# Patient Record
Sex: MALE | Race: WHITE
[De-identification: names, ages, dates, MRNs, and addresses within clinical notes are randomized per-mention and may not be internally consistent; named-entity substitution may affect disease eponyms.]

---

## 2018-04-29 ENCOUNTER — HOSPITAL ENCOUNTER (EMERGENCY)
Dept: HOSPITAL 95 - ER | Age: 48
Discharge: HOME | End: 2018-04-29
Payer: COMMERCIAL

## 2018-04-29 VITALS — BODY MASS INDEX: 27.09 KG/M2 | WEIGHT: 200 LBS | HEIGHT: 72 IN

## 2018-04-29 DIAGNOSIS — Z88.6: ICD-10-CM
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
## 2018-04-29 DIAGNOSIS — Z79.899: ICD-10-CM

## 2018-04-29 DIAGNOSIS — F17.200: ICD-10-CM

## 2018-04-29 DIAGNOSIS — S83.92XA: Primary | ICD-10-CM

## 2018-04-29 DIAGNOSIS — Z88.8: ICD-10-CM

## 2019-10-09 ENCOUNTER — HOSPITAL ENCOUNTER (OUTPATIENT)
Dept: HOSPITAL 95 - ORSCMMR | Age: 49
Discharge: HOME | End: 2019-10-09
Attending: SURGERY
Payer: COMMERCIAL

## 2019-10-09 VITALS — BODY MASS INDEX: 27.56 KG/M2 | HEIGHT: 70.98 IN | WEIGHT: 196.87 LBS

## 2019-10-09 DIAGNOSIS — F17.210: ICD-10-CM

## 2019-10-09 DIAGNOSIS — J45.909: ICD-10-CM

## 2019-10-09 DIAGNOSIS — E03.9: ICD-10-CM

## 2019-10-09 DIAGNOSIS — Z79.899: ICD-10-CM

## 2019-10-09 DIAGNOSIS — K40.90: Primary | ICD-10-CM

## 2019-10-09 DIAGNOSIS — D53.9: ICD-10-CM

## 2019-10-09 PROCEDURE — C1781 MESH (IMPLANTABLE): HCPCS

## 2019-10-09 PROCEDURE — 0YU60JZ SUPPLEMENT LEFT INGUINAL REGION WITH SYNTHETIC SUBSTITUTE, OPEN APPROACH: ICD-10-PCS | Performed by: SURGERY

## 2019-10-09 NOTE — NUR
History, Chart, Medications and Allergies reviewed before start of
procedure. Lungs course w/exp wheeze t/o. biox 97% on room air.
Patient States Post-Procedure ride home has been arranged.

## 2021-11-13 ENCOUNTER — HOSPITAL ENCOUNTER (EMERGENCY)
Dept: HOSPITAL 95 - ER | Age: 51
LOS: 1 days | Discharge: HOME | End: 2021-11-14
Payer: COMMERCIAL

## 2021-11-13 VITALS — BODY MASS INDEX: 29.62 KG/M2 | WEIGHT: 200 LBS | HEIGHT: 69 IN

## 2021-11-13 DIAGNOSIS — F17.200: ICD-10-CM

## 2021-11-13 DIAGNOSIS — Z88.8: ICD-10-CM

## 2021-11-13 DIAGNOSIS — Z79.899: ICD-10-CM

## 2021-11-13 DIAGNOSIS — R42: Primary | ICD-10-CM

## 2021-11-13 DIAGNOSIS — E03.9: ICD-10-CM

## 2021-11-13 LAB
ALBUMIN SERPL BCP-MCNC: 3.4 G/DL (ref 3.4–5)
ALBUMIN/GLOB SERPL: 1 {RATIO} (ref 0.8–1.8)
ALT SERPL W P-5'-P-CCNC: 29 U/L (ref 12–78)
ANION GAP SERPL CALCULATED.4IONS-SCNC: 6 MMOL/L (ref 6–16)
AST SERPL W P-5'-P-CCNC: 23 U/L (ref 12–37)
BASOPHILS # BLD AUTO: 0.03 K/MM3 (ref 0–0.23)
BASOPHILS NFR BLD AUTO: 0 % (ref 0–2)
BILIRUB SERPL-MCNC: 0.2 MG/DL (ref 0.1–1)
BUN SERPL-MCNC: 13 MG/DL (ref 8–24)
CALCIUM SERPL-MCNC: 8.9 MG/DL (ref 8.5–10.1)
CHLORIDE SERPL-SCNC: 109 MMOL/L (ref 98–108)
CO2 SERPL-SCNC: 26 MMOL/L (ref 21–32)
CREAT SERPL-MCNC: 0.91 MG/DL (ref 0.6–1.2)
DEPRECATED RDW RBC AUTO: 42.1 FL (ref 35.1–46.3)
EOSINOPHIL # BLD AUTO: 0.18 K/MM3 (ref 0–0.68)
EOSINOPHIL NFR BLD AUTO: 3 % (ref 0–6)
ERYTHROCYTE [DISTWIDTH] IN BLOOD BY AUTOMATED COUNT: 13.2 % (ref 11.7–14.2)
GLOBULIN SER CALC-MCNC: 3.5 G/DL (ref 2.2–4)
GLUCOSE SERPL-MCNC: 103 MG/DL (ref 70–99)
HCT VFR BLD AUTO: 44.2 % (ref 37–53)
HGB BLD-MCNC: 14.8 G/DL (ref 13.5–17.5)
IMM GRANULOCYTES # BLD AUTO: 0.01 K/MM3 (ref 0–0.1)
IMM GRANULOCYTES NFR BLD AUTO: 0 % (ref 0–1)
LYMPHOCYTES # BLD AUTO: 2.98 K/MM3 (ref 0.84–5.2)
LYMPHOCYTES NFR BLD AUTO: 43 % (ref 21–46)
MCHC RBC AUTO-ENTMCNC: 33.5 G/DL (ref 31.5–36.5)
MCV RBC AUTO: 88 FL (ref 80–100)
MONOCYTES # BLD AUTO: 0.41 K/MM3 (ref 0.16–1.47)
MONOCYTES NFR BLD AUTO: 6 % (ref 4–13)
NEUTROPHILS # BLD AUTO: 3.41 K/MM3 (ref 1.96–9.15)
NEUTROPHILS NFR BLD AUTO: 49 % (ref 41–73)
NRBC # BLD AUTO: 0 K/MM3 (ref 0–0.02)
NRBC BLD AUTO-RTO: 0 /100 WBC (ref 0–0.2)
PLATELET # BLD AUTO: 272 K/MM3 (ref 150–400)
POTASSIUM SERPL-SCNC: 3.7 MMOL/L (ref 3.5–5.5)
PROT SERPL-MCNC: 6.9 G/DL (ref 6.4–8.2)
SODIUM SERPL-SCNC: 141 MMOL/L (ref 136–145)
TROPONIN I SERPL-MCNC: <0.015 NG/ML (ref 0–0.04)

## 2021-11-22 ENCOUNTER — HOSPITAL ENCOUNTER (OUTPATIENT)
Dept: HOSPITAL 95 - LAB | Age: 51
Discharge: HOME | End: 2021-11-22
Attending: NURSE PRACTITIONER
Payer: COMMERCIAL

## 2021-11-22 DIAGNOSIS — Z13.6: Primary | ICD-10-CM

## 2021-11-22 LAB
CHOLEST SERPL-MCNC: 217 MG/DL (ref 50–200)
CHOLEST/HDLC SERPL: 3.5 {RATIO}
HDLC SERPL-MCNC: 62 MG/DL (ref 39–?)
LDLC SERPL CALC-MCNC: 144 MG/DL (ref 0–110)
LDLC/HDLC SERPL: 2.3 {RATIO}
TRIGL SERPL-MCNC: 54 MG/DL (ref 30–160)
VLDLC SERPL CALC-MCNC: 11 MG/DL (ref 6–32)

## 2022-09-06 ENCOUNTER — HOSPITAL ENCOUNTER (OUTPATIENT)
Dept: HOSPITAL 95 - LAB SHORT | Age: 52
Discharge: HOME | End: 2022-09-06
Attending: NURSE PRACTITIONER
Payer: COMMERCIAL

## 2022-09-06 DIAGNOSIS — R73.03: ICD-10-CM

## 2022-09-06 DIAGNOSIS — Z13.1: ICD-10-CM

## 2022-09-06 DIAGNOSIS — Z12.5: ICD-10-CM

## 2022-09-06 DIAGNOSIS — Z13.228: Primary | ICD-10-CM

## 2022-09-06 DIAGNOSIS — E04.9: ICD-10-CM

## 2022-09-06 DIAGNOSIS — E78.5: ICD-10-CM

## 2022-09-06 DIAGNOSIS — F10.10: ICD-10-CM

## 2022-09-06 DIAGNOSIS — Z11.4: ICD-10-CM

## 2022-09-06 DIAGNOSIS — E53.8: ICD-10-CM

## 2022-09-06 DIAGNOSIS — E03.9: ICD-10-CM

## 2022-09-06 DIAGNOSIS — R73.9: ICD-10-CM

## 2022-09-06 DIAGNOSIS — D64.9: ICD-10-CM

## 2022-09-06 LAB
ALBUMIN SERPL BCP-MCNC: 3.7 G/DL (ref 3.4–5)
ALBUMIN/GLOB SERPL: 1.2 {RATIO} (ref 0.8–1.8)
ALT SERPL W P-5'-P-CCNC: 25 U/L (ref 12–78)
ANION GAP SERPL CALCULATED.4IONS-SCNC: 6 MMOL/L (ref 6–16)
AST SERPL W P-5'-P-CCNC: 21 U/L (ref 12–37)
BASOPHILS # BLD AUTO: 0.05 K/MM3 (ref 0–0.23)
BASOPHILS NFR BLD AUTO: 1 % (ref 0–2)
BILIRUB SERPL-MCNC: 0.3 MG/DL (ref 0.1–1)
BUN SERPL-MCNC: 13 MG/DL (ref 8–24)
CALCIUM SERPL-MCNC: 9.4 MG/DL (ref 8.5–10.1)
CHLORIDE SERPL-SCNC: 107 MMOL/L (ref 98–108)
CHOLEST SERPL-MCNC: 195 MG/DL (ref 50–200)
CHOLEST/HDLC SERPL: 3.5 {RATIO}
CO2 SERPL-SCNC: 27 MMOL/L (ref 21–32)
CREAT SERPL-MCNC: 0.78 MG/DL (ref 0.6–1.2)
DEPRECATED RDW RBC AUTO: 41 FL (ref 35.1–46.3)
EOSINOPHIL # BLD AUTO: 0.16 K/MM3 (ref 0–0.68)
EOSINOPHIL NFR BLD AUTO: 2 % (ref 0–6)
ERYTHROCYTE [DISTWIDTH] IN BLOOD BY AUTOMATED COUNT: 12.8 % (ref 11.7–14.2)
GLOBULIN SER CALC-MCNC: 3 G/DL (ref 2.2–4)
GLUCOSE SERPL-MCNC: 104 MG/DL (ref 70–99)
HCT VFR BLD AUTO: 43.5 % (ref 37–53)
HDLC SERPL-MCNC: 56 MG/DL (ref 39–?)
HGB BLD-MCNC: 14.6 G/DL (ref 13.5–17.5)
IMM GRANULOCYTES # BLD AUTO: 0.03 K/MM3 (ref 0–0.1)
IMM GRANULOCYTES NFR BLD AUTO: 0 % (ref 0–1)
LDLC SERPL CALC-MCNC: 128 MG/DL (ref 0–110)
LDLC/HDLC SERPL: 2.3 {RATIO}
LYMPHOCYTES # BLD AUTO: 2.24 K/MM3 (ref 0.84–5.2)
LYMPHOCYTES NFR BLD AUTO: 21 % (ref 21–46)
MCHC RBC AUTO-ENTMCNC: 33.6 G/DL (ref 31.5–36.5)
MCV RBC AUTO: 87 FL (ref 80–100)
MONOCYTES # BLD AUTO: 0.74 K/MM3 (ref 0.16–1.47)
MONOCYTES NFR BLD AUTO: 7 % (ref 4–13)
NEUTROPHILS # BLD AUTO: 7.5 K/MM3 (ref 1.96–9.15)
NEUTROPHILS NFR BLD AUTO: 70 % (ref 41–73)
NRBC # BLD AUTO: 0 K/MM3 (ref 0–0.02)
NRBC BLD AUTO-RTO: 0 /100 WBC (ref 0–0.2)
PLATELET # BLD AUTO: 324 K/MM3 (ref 150–400)
POTASSIUM SERPL-SCNC: 4 MMOL/L (ref 3.5–5.5)
PROT SERPL-MCNC: 6.7 G/DL (ref 6.4–8.2)
PSA SERPL-MCNC: 0.42 NG/ML (ref 0–4)
SODIUM SERPL-SCNC: 140 MMOL/L (ref 136–145)
TRIGL SERPL-MCNC: 57 MG/DL (ref 30–160)
TSH SERPL DL<=0.005 MIU/L-ACNC: 0.31 UIU/ML (ref 0.36–4.8)
VLDLC SERPL CALC-MCNC: 11 MG/DL (ref 6–32)

## 2022-09-06 PROCEDURE — G0103 PSA SCREENING: HCPCS

## 2023-06-16 ENCOUNTER — HOSPITAL ENCOUNTER (OUTPATIENT)
Dept: HOSPITAL 95 - LAB | Age: 53
End: 2023-06-16
Attending: NURSE PRACTITIONER
Payer: COMMERCIAL

## 2023-06-16 DIAGNOSIS — E53.8: Primary | ICD-10-CM

## 2023-10-24 ENCOUNTER — HOSPITAL ENCOUNTER (OUTPATIENT)
Dept: HOSPITAL 95 - LAB SHORT | Age: 53
Discharge: HOME | End: 2023-10-24
Attending: NURSE PRACTITIONER
Payer: COMMERCIAL

## 2023-10-24 DIAGNOSIS — E53.8: Primary | ICD-10-CM

## 2024-09-26 ENCOUNTER — HOSPITAL ENCOUNTER (OUTPATIENT)
Dept: HOSPITAL 95 - LAB | Age: 54
Discharge: HOME | End: 2024-09-26
Attending: NURSE PRACTITIONER
Payer: COMMERCIAL

## 2024-09-26 DIAGNOSIS — M72.2: ICD-10-CM

## 2024-09-26 DIAGNOSIS — R73.03: ICD-10-CM

## 2024-09-26 DIAGNOSIS — E03.9: ICD-10-CM

## 2024-09-26 DIAGNOSIS — E04.9: Primary | ICD-10-CM

## 2024-09-26 DIAGNOSIS — M25.50: ICD-10-CM

## 2024-09-26 LAB
TSH SERPL DL<=0.005 MIU/L-ACNC: 5.38 UIU/ML (ref 0.36–4.8)
URATE SERPL-MCNC: 6.5 MG/DL (ref 3.5–7.2)